# Patient Record
Sex: FEMALE | Race: WHITE | NOT HISPANIC OR LATINO | URBAN - METROPOLITAN AREA
[De-identification: names, ages, dates, MRNs, and addresses within clinical notes are randomized per-mention and may not be internally consistent; named-entity substitution may affect disease eponyms.]

---

## 2020-07-16 ENCOUNTER — TELEPHONE (OUTPATIENT)
Dept: GENETICS | Facility: HOSPITAL | Age: 20
End: 2020-07-16

## 2020-07-28 ENCOUNTER — CLINICAL SUPPORT (OUTPATIENT)
Dept: GENETICS | Facility: HOSPITAL | Age: 20
End: 2020-07-28

## 2020-07-28 DIAGNOSIS — Z84.81 FAMILY HISTORY OF BRCA1 GENE POSITIVE: Primary | ICD-10-CM

## 2020-07-28 DIAGNOSIS — Z80.3 FAMILY HISTORY OF BREAST CANCER: ICD-10-CM

## 2020-07-29 NOTE — PROGRESS NOTES
Karen Rhodes, a 20-year-old female, was seen for genetic counseling due to a family history of breast cancer and recent positive genetic testing in her mother.  Genetic counseling was provided via telephone. Ms. Rhodes was 13 at menarche, is premenopausal and nulliparous. Her has not had any prior breast imaging. She was interested in discussing her risk for a hereditary cancer syndrome. Ms. Rhodes opted to pursue single site analysis for the mutations identified in her mother. A saliva sample collection kit was sent to Ms. Rhodes’s house.  Results are expected in 2-3 weeks after the kit is sent in.     PERTINENT FAMILY HISTORY: (See attached pedigree)   Mother:    Triple negative breast cancer, 44        BRCA1+, RAD51D+, MUTYH+ on CancerNext panel  Mat. Grandmother:   Breast cancer, 43    Ms. Rhodes’s mother’s genetic test results were available for review, confirming the familial mutations on the [place] CancerNext panel.     RISK ASSESSMENT:  Ms. Rhodes’s family history led to concern regarding a hereditary cancer syndrome.  Ms. Rhodes’s mother recently tested positive for pathogenic mutations in the BRCA1 gene, RAD51D gene, and MUTYH gene; therefore, Ms. Rhodes has a separate 50% chance of testing positive for each of these gene mutations.  Single site testing for a known familial mutations is appropriate since there is no known paternal family history of cancer.  These risk assessments are based on the family history information provided at the time of the appointment, and could change in the future should new information be obtained.    GENETIC COUNSELING: Cancer cases follow three general patterns: sporadic, familial, and hereditary.  While most cancer is sporadic, some cases appear to occur in family clusters.  These cases are said to be familial and account for 10-20% of breast cancer cases.  Familial cases may be due to a combination of shared genes and environmental factors among  family members.  In even fewer families, the risk for cancer is inherited, and the genes responsible for the increased cancer risk are known.       Family histories typical of hereditary cancer syndromes usually include multiple first- and second-degree relatives diagnosed with cancer types that define a syndrome.  These cases tend to be diagnosed at younger- than-expected ages and can be bilateral or multifocal.  The cancer in these families follows an autosomal dominant inheritance pattern, which indicates the likely presence of a mutation in a cancer susceptibility gene.  Children and siblings of an individual believed to carry this mutation have a 50% chance of inheriting that mutation, thereby inheriting the increased risk to develop cancer.    Hereditary breast cancer accounts for 5-10% of all cases of breast cancer. A significant proportion of hereditary breast cancer can be attributed to mutations in the BRCA1 and BRCA2 genes. Mutations in these genes confer an increased risk for breast cancer, ovarian cancer, male breast cancer, prostate cancer, and pancreatic cancer.  Mutations in BRCA1 confer up to an 87% lifetime risk of breast cancer and up to a 44% lifetime risk of ovarian cancer. Mutations in BRCA1 have been reported to confer up to a 60% risk of a second breast cancer. Additionally, mutations in the BRCA1 gene may confer an increased risk of pancreatic cancer, male breast cancer, and prostate cancer.  Ms. Rhodes’s mother also tested positive for a RAD51D mutation, therefore we discussed mutations in RAD51D have been estimated to confer a lifetime risk for ovarian cancer between 10-12%.     Ms. Rhodes’s mother also tested positive for one pathogenic mutation in the MUTYH gene. The presence of only one MUTYH mutation indicates that an individual does NOT have MUTYH-Associated Polyposis (MAP), but is a carrier.  Unlike most hereditary cancer syndromes, MAP is inherited in an autosomal recessive  manner; therefore, if someone is identified as having one MUTYH mutation, it does not place them at the significantly increased cancer risks that are seen when someone has two mutations, one in each copy of the gene.  The risk of colon cancer in individuals with one MUTYH mutation is unclear. The risk of colon cancer was not found to be significantly increased in large population-based studies, whereas family-based studies found a somewhat increased risk of colon cancer, ranging from a 5.6-10% lifetime risk in comparison to the general population risk of 3-5%. Some studies have also suggested a slightly increased risk of breast cancer for individuals who are MUTYH mutation carriers, with the highest reported risk being 1.5 times the general population risk (not elevated above the 20% threshold that is considered high risk).  There are not increased screening recommendations based on that possible modest increase in risk.   Because the general population MUTYH carrier frequency is 1-2%; it is possible for family members to have inherited an additional mutation from the other parent. Therefore, if Ms. hRodes is found to have the MUTYH mutation identified in her mother, full MUTYH analysis will be recommended to evaluate for a second mutation.  For individuals found to have two MUTYH mutations, screening colonoscopy begins at age 25.      GENETIC TESTING: The risks, benefits and limitations of genetic testing and implications for clinical management following testing were reviewed.  DNA test results can influence decisions regarding screening and prevention.  Genetic testing can have significant psychological implications for both individuals and families. Also discussed was the possibility of employment and insurance discrimination based on genetic test results and the laws in place to prevent this (LAVELL), as well as the limitations of these laws.   The benefits and limitations of genetic testing were discussed and  Ms. Rhodes decided to pursue testing via the panel. The implications of a positive or negative test result were discussed.     PLAN:  Results are expected in 2-3 weeks and Ms. Rhodes will be contacted by telephone to discuss. Ms. Rhodes is welcome to call us if she has any questions or concerns at [phone].      Parul Pinto MS, [facility] [place], [facility]  Licensed Certified Genetic Counselor

## 2020-09-08 ENCOUNTER — DOCUMENTATION (OUTPATIENT)
Dept: GENETICS | Facility: HOSPITAL | Age: 20
End: 2020-09-08

## 2020-09-08 NOTE — PROGRESS NOTES
SUMMARY:  Ms. 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     __________________________________________________________    Karen Rhodes, a 20-year-old female, was seen for genetic counseling due to a family history of breast cancer and recent positive genetic testing in her mother.  Genetic counseling was provided via telephone. Ms. Rhodes was 13 at menarche, is premenopausal and nulliparous. Ms. Rhodes has not had any prior breast imaging. She was interested in discussing her risk for a hereditary cancer syndrome. Ms. Rhodes opted to pursue single site analysis for the mutations identified in her mother. Genetic testing was positive for a pathogenic mutation in the BRCA1 gene. These results were discussed with Ms. Rhodes by telephone on 9/8/2020.    PERTINENT FAMILY HISTORY: (See attached pedigree)   Mother:     Triple negative breast cancer, 44   BRCA1+, RAD51D+, MUTYH+ on CancerNext panel  Mat. Grandmother:    Breast cancer, 43    Ms. Rhodes’s mother’s genetic test results were available for review, confirming the familial mutations on the [employer] CancerNext panel.     RISK ASSESSMENT:  Ms. Rhodes’s family history led to concern regarding a hereditary cancer syndrome.  Ms. Rhodes’s mother recently tested positive for pathogenic mutations in the BRCA1 gene, RAD51D gene, and MUTYH gene; therefore, Ms. Rhodes has a separate 50% chance of testing positive for each of these gene mutations.  Single site testing for a known familial mutations is appropriate since there is no known paternal family history of cancer.  These risk assessments are based on the family history information provided at the time of the appointment, and could change in the future should new information be obtained.    GENETIC COUNSELING: Cancer cases follow three general patterns: sporadic, familial, and hereditary.  While most  cancer is sporadic, some cases appear to occur in family clusters.  These cases are said to be familial and account for 10-20% of breast cancer cases.  Familial cases may be due to a combination of shared genes and environmental factors among family members.  In even fewer families, the risk for cancer is inherited, and the genes responsible for the increased cancer risk are known.       Family histories typical of hereditary cancer syndromes usually include multiple first- and second-degree relatives diagnosed with cancer types that define a syndrome.  These cases tend to be diagnosed at younger- than-expected ages and can be bilateral or multifocal.  The cancer in these families follows an autosomal dominant inheritance pattern, which indicates the likely presence of a mutation in a cancer susceptibility gene.  Children and siblings of an individual believed to carry this mutation have a 50% chance of inheriting that mutation, thereby inheriting the increased risk to develop cancer.    Hereditary breast cancer accounts for 5-10% of all cases of breast cancer. A significant proportion of hereditary breast cancer can be attributed to mutations in the BRCA1 and BRCA2 genes. Mutations in these genes confer an increased risk for breast cancer, ovarian cancer, male breast cancer, prostate cancer, and pancreatic cancer.  Mutations in BRCA1 confer up to an 87% lifetime risk of breast cancer and up to a 44% lifetime risk of ovarian cancer. Mutations in BRCA1 have been reported to confer up to a 60% risk of a second breast cancer. Additionally, mutations in the BRCA1 gene may confer an increased risk of pancreatic cancer, male breast cancer, and prostate cancer.      Ms. Rhodes’s mother also tested positive for a RAD51D mutation, therefore we discussed mutations in RAD51D have been estimated to confer a lifetime risk for ovarian cancer between 10-12%. Ms. Rhodes’s mother also tested positive for one pathogenic mutation  in the MUTYH gene. The presence of only one MUTYH mutation indicates that an individual does NOT have MUTYH-Associated Polyposis (MAP), but is a carrier.  Unlike most hereditary cancer syndromes, MAP is inherited in an autosomal recessive manner; therefore, if someone is identified as having one MUTYH mutation, it does not place them at the significantly increased cancer risks that are seen when someone has two mutations, one in each copy of the gene.      GENETIC TESTING: The risks, benefits and limitations of genetic testing and implications for clinical management following testing were reviewed.  DNA test results can influence decisions regarding screening and prevention.  Genetic testing can have significant psychological implications for both individuals and families. Also discussed was the possibility of employment and insurance discrimination based on genetic test results and the laws in place to prevent this (LAVELL), as well as the limitations of these laws.   The benefits and limitations of genetic testing were discussed and Ms. Rhodes decided to pursue testing for the mutations identified in her mother. The implications of a positive or negative test result were discussed.     TEST RESULTS: Testing was positive for the familial BRCA1 mutation ([id number]) in the BRCA1 gene (see attached results). This is causative of Hereditary Breast and Ovarian Cancer syndrome (HBOC). Genetic testing is indicated for other relatives to determine whether they are at an increased risk. Until each at-risk family member has been proven not to carry this BRCA1 mutation, they could be offered increased surveillance.  We would be happy to see family members who live in the area in our clinic to further discuss this information and testing options. Relatives can call our office at [phone] for assistance in scheduling an appointment; however, a physician referral to our office will prompt our coordinator to contact patients  to schedule an appointment.  For family members who live elsewhere, there are genetic counselors at most [facility]. They can find a genetic counselor by visiting the National Society of Genetic Counselors website at www.nsgc.org or they can call our office and we would be happy to give them the contact information of the closest genetic counselor. Testing is available to individuals once they are 18 years of age.  Ms. Rhodes tested negative for the RAD51D and MUTYH mutations identified in her mother.      CANCER RISK:  Mutations in BRCA1 confer up to an 87% lifetime risk of breast cancer and up to a 44% lifetime risk of ovarian cancer. Mutations in BRCA1 have been reported to confer up to a 60% risk of a second breast cancer. Additionally, mutations in the BRCA1 gene may confer an increased risk of pancreatic cancer, male breast cancer, and prostate cancer.    CANCER SCREENING:  Options available to individuals with a BRCA1 mutation and at high risk for breast and ovarian cancer were discussed, including increased surveillance, chemoprevention, and prophylactic surgery (mastectomy and/or oophorectomy). Current data does not support routine ovarian cancer screening.  We briefly discussed transvaginal ultrasound and serum CA-125, however neither has been found to be sufficiently sensitive or specific to be recommended for individuals that have an increased risk for ovarian cancer.  Some physicians consider offering screening between ages 30-35, before a patient is at an age where BSO is typically offered.      For women with a BRCA1 mutation who choose not to undergo bilateral mastectomy, increased breast cancer surveillance is warranted. Increased surveillance, based on NCCN guidelines, would consist of semi-annual clinical breast exam and monthly self-breast exam starting at age 18 and annual breast MRI starting at age 25, as well as annual mammogram and annual breast MRI beginning at age 30. Breast  cancer chemoprevention is another option that individuals may wish to consider.  Studies have shown that Tamoxifen and Raloxifene can cut the risk of estrogen receptor positive breast cancer by 50% when taken by high-risk women. A large number of BRCA1-related breast cancers are not estrogen positive and, therefore, the risk-reduction benefit is likely less than 50% for individuals with a BRCA1 mutation. There are risks associated with these medications; therefore, the risks versus benefits must be considered prior to deciding to take chemopreventative medications.    Screening for males with BRCA1 mutations should include self-breast exam, annual clinical breast exam beginning at age 35, and considering prostate cancer screening beginning at age 45. Mammography can be considered in males with gynecomastia.  There are no standardized screening tests that have been proven to be effective in early pancreatic cancer detection.  In the absence of a family history, there are not typically screening recommendations made.  In cases where an individual has a BRCA mutation and family history of pancreatic cancer some experts consider screening with endoscopic ultrasound, high-resolution pancreatic protocol CT, or MRI, starting at age 50 or 10 years younger than the earliest diagnosis of pancreas cancer in the family.  Since these screening methods are not standard of care, consideration of referral to a clinical research screening program is appropriate if a patient wishes to pursue screening.      SURGICAL OPTIONS:  Risk-reducing bilateral mastectomy has been shown to reduce the risk of breast cancer by approximately 90%.     Risk-reducing bilateral salpingo-oopherectomy (BSO) has been shown to reduce the risk of ovarian cancer by as much as 96%.  BSO is typically recommended between ages 35-40 in women who have a BRCA1 mutation. Again, since a significant percentage of breast cancer associated with BRCA1 is hormone  receptor negative, BSO has not been shown to be as effective in reducing the incidence of breast cancer in this population.  It has been suggested that risk-reducing BSO in high-risk women be done by a surgeon with experience in this population, such as a gynecologic oncologist.  Careful removal of the fallopian tubes is essential, due to the residual risk for fallopian tube cancer in BRCA positive patients. Additionally, 2-10% of BRCA positive patients are found to have ovarian cancer at the time of BSO; therefore, careful pathologic exam of the ovaries by serial sectioning is recommended.  In addition, cytologic evaluation of peritoneal washings could be considered.     PLAN: Genetic counseling remains available to Ms. Rhodes and her family. We discussed online support groups that are available for women with a BRCA mutation, including [employer] and [facility]. The [facility] at [facility] is available for coordination of increased screening, however regular breast imaging would not be recommended until age 25 per NCCN guidelines. Ms. Rhodes was encouraged to contact me at [phone] once she receives this note in the mail if she has any questions or would like to schedule a follow-up appointment.      Parul Pinto MS, [facility] [place] [place]  Licensed Certified Genetic Counselor      Cc: Karen Reese MD

## 2023-09-19 ENCOUNTER — HOSPITAL ENCOUNTER (EMERGENCY)
Facility: HOSPITAL | Age: 23
Discharge: LEFT BEFORE DISCHARGE | End: 2023-09-19
Attending: EMERGENCY MEDICINE | Admitting: EMERGENCY MEDICINE

## 2023-09-19 VITALS
SYSTOLIC BLOOD PRESSURE: 149 MMHG | RESPIRATION RATE: 18 BRPM | HEART RATE: 83 BPM | OXYGEN SATURATION: 98 % | TEMPERATURE: 98.5 F | HEIGHT: 65 IN | BODY MASS INDEX: 25.49 KG/M2 | DIASTOLIC BLOOD PRESSURE: 92 MMHG | WEIGHT: 153 LBS

## 2023-09-19 DIAGNOSIS — R00.2 PALPITATIONS: Primary | ICD-10-CM

## 2023-09-19 DIAGNOSIS — R42 DIZZINESS: ICD-10-CM

## 2023-09-19 PROCEDURE — 93005 ELECTROCARDIOGRAM TRACING: CPT | Performed by: EMERGENCY MEDICINE

## 2023-09-19 PROCEDURE — 99282 EMERGENCY DEPT VISIT SF MDM: CPT

## 2023-09-19 PROCEDURE — 93005 ELECTROCARDIOGRAM TRACING: CPT

## 2023-09-19 RX ORDER — SODIUM CHLORIDE 0.9 % (FLUSH) 0.9 %
10 SYRINGE (ML) INJECTION AS NEEDED
Status: DISCONTINUED | OUTPATIENT
Start: 2023-09-19 | End: 2023-09-19 | Stop reason: HOSPADM

## 2023-09-19 RX ORDER — DIAZEPAM 5 MG/1
5 TABLET ORAL ONCE
Status: DISCONTINUED | OUTPATIENT
Start: 2023-09-19 | End: 2023-09-19 | Stop reason: HOSPADM

## 2023-09-19 NOTE — ED PROVIDER NOTES
Subjective   History of Present Illness  Numbness tingling, arms and legs. Palpitations. Ha. Vertigo symptoms. Hx of anxiety. No cp. No soa. Had to leave work today for further evaluation. Seen by pcp recently. Being sent to hormone specialist. Symptoms currently much better. No abd pain. No fever or chills.      Review of Systems    No past medical history on file.    No Known Allergies    No past surgical history on file.    No family history on file.    Social History     Socioeconomic History    Marital status: Single           Objective   Physical Exam  Constitutional:       Appearance: She is well-developed.   HENT:      Head: Normocephalic and atraumatic.      Right Ear: External ear normal.      Left Ear: External ear normal.      Nose: Nose normal.   Eyes:      Conjunctiva/sclera: Conjunctivae normal.      Pupils: Pupils are equal, round, and reactive to light.   Cardiovascular:      Rate and Rhythm: Normal rate and regular rhythm.      Heart sounds: Normal heart sounds.   Pulmonary:      Effort: Pulmonary effort is normal.      Breath sounds: Normal breath sounds.   Abdominal:      General: Bowel sounds are normal.      Palpations: Abdomen is soft.   Musculoskeletal:         General: Normal range of motion.      Cervical back: Normal range of motion and neck supple.   Skin:     General: Skin is warm and dry.   Neurological:      Mental Status: She is alert and oriented to person, place, and time.   Psychiatric:         Behavior: Behavior normal.         Thought Content: Thought content normal.         Judgment: Judgment normal.       Procedures           ED Course  ED Course as of 09/19/23 1743   Tue Sep 19, 2023   1610 Controlled.  Electrolyte abnormality.  Thyroid disorder.  We will get imaging at some point as well.  D-dimer ordered as well as other lab work. [CAESAR]   1740 Pt appears to have eloped [CAESAR]      ED Course User Index  [CAESAR] lEdon Durant APRN                                           Medical  Decision Making  Amount and/or Complexity of Data Reviewed  Labs: ordered.  Radiology: ordered.  ECG/medicine tests: ordered.    Risk  Prescription drug management.        Final diagnoses:   Palpitations   Dizziness       ED Disposition  ED Disposition       ED Disposition   Eloped    Condition   --    Comment   --               No follow-up provider specified.       Medication List      No changes were made to your prescriptions during this visit.            Eldon Durant, APRN  09/19/23 [id number]

## 2023-09-25 LAB
QT INTERVAL: 374 MS
QTC INTERVAL: 441 MS